# Patient Record
Sex: FEMALE | Race: WHITE | NOT HISPANIC OR LATINO | Employment: FULL TIME
[De-identification: names, ages, dates, MRNs, and addresses within clinical notes are randomized per-mention and may not be internally consistent; named-entity substitution may affect disease eponyms.]

---

## 2017-01-05 ENCOUNTER — PRIOR ORIGINAL RECORDS (OUTPATIENT)
Dept: OTHER | Age: 52
End: 2017-01-05

## 2017-12-31 ENCOUNTER — PRIOR ORIGINAL RECORDS (OUTPATIENT)
Dept: OTHER | Age: 52
End: 2017-12-31

## 2018-02-22 ENCOUNTER — PRIOR ORIGINAL RECORDS (OUTPATIENT)
Dept: OTHER | Age: 53
End: 2018-02-22

## 2018-11-29 ENCOUNTER — HOSPITAL (OUTPATIENT)
Dept: OTHER | Age: 53
End: 2018-11-29
Attending: SPECIALIST

## 2018-12-31 ENCOUNTER — PRIOR ORIGINAL RECORDS (OUTPATIENT)
Dept: OTHER | Age: 53
End: 2018-12-31

## 2019-02-21 ENCOUNTER — PRIOR ORIGINAL RECORDS (OUTPATIENT)
Dept: OTHER | Age: 54
End: 2019-02-21

## 2020-09-19 ENCOUNTER — HOSPITAL ENCOUNTER (EMERGENCY)
Age: 55
Discharge: HOME | End: 2020-09-19
Attending: EMERGENCY MEDICINE

## 2020-09-19 ENCOUNTER — APPOINTMENT (OUTPATIENT)
Dept: GENERAL RADIOLOGY | Age: 55
End: 2020-09-19

## 2020-09-19 VITALS
HEART RATE: 63 BPM | RESPIRATION RATE: 18 BRPM | SYSTOLIC BLOOD PRESSURE: 110 MMHG | BODY MASS INDEX: 20.32 KG/M2 | DIASTOLIC BLOOD PRESSURE: 68 MMHG | OXYGEN SATURATION: 98 % | TEMPERATURE: 98.6 F | WEIGHT: 119.05 LBS | HEIGHT: 64 IN

## 2020-09-19 DIAGNOSIS — S52.616A: ICD-10-CM

## 2020-09-19 DIAGNOSIS — S52.502A CLOSED FRACTURE OF DISTAL END OF LEFT RADIUS, UNSPECIFIED FRACTURE MORPHOLOGY, INITIAL ENCOUNTER: Primary | ICD-10-CM

## 2020-09-19 PROCEDURE — 73110 X-RAY EXAM OF WRIST: CPT

## 2020-09-19 PROCEDURE — 99283 EMERGENCY DEPT VISIT LOW MDM: CPT

## 2020-09-19 PROCEDURE — 29125 APPL SHORT ARM SPLINT STATIC: CPT

## 2020-09-19 ASSESSMENT — ENCOUNTER SYMPTOMS
DIZZINESS: 0
FEVER: 0
CHILLS: 0
WEAKNESS: 0
SHORTNESS OF BREATH: 0
COUGH: 0
NUMBNESS: 0

## 2020-09-21 ENCOUNTER — HOSPITAL ENCOUNTER (OUTPATIENT)
Age: 55
End: 2020-09-21
Attending: ORTHOPAEDIC SURGERY | Admitting: ORTHOPAEDIC SURGERY

## 2020-09-21 ENCOUNTER — HOSPITAL ENCOUNTER (OUTPATIENT)
Dept: LAB | Age: 55
Discharge: HOME | End: 2020-09-21
Attending: ORTHOPAEDIC SURGERY

## 2020-09-21 DIAGNOSIS — R79.1 ABNORMAL COAGULATION PROFILE: ICD-10-CM

## 2020-09-21 DIAGNOSIS — E21.5 PARATHYROID DISEASE (CMD): ICD-10-CM

## 2020-09-21 DIAGNOSIS — E55.9 AVITAMINOSIS D: Primary | ICD-10-CM

## 2020-09-21 DIAGNOSIS — E55.9 VITAMIN D DEFICIENCY, UNSPECIFIED: Primary | ICD-10-CM

## 2020-09-21 DIAGNOSIS — M35.3 POLYMYALGIA RHEUMATICA (CMD): ICD-10-CM

## 2020-09-21 DIAGNOSIS — Z01.812 PRE-PROCEDURAL LABORATORY EXAMINATIONS: Primary | ICD-10-CM

## 2020-09-21 DIAGNOSIS — E55.9 AVITAMINOSIS D: ICD-10-CM

## 2020-09-21 LAB
ANION GAP SERPL CALC-SCNC: 9 MMOL/L (ref 10–20)
APTT PPP: 29 SEC (ref 22–32)
ATRIAL RATE (BPM): 67
BASOPHILS # BLD: 0.1 K/MCL (ref 0–0.3)
BASOPHILS NFR BLD: 1 %
BUN SERPL-MCNC: 13 MG/DL (ref 6–20)
BUN/CREAT SERPL: 16 (ref 7–25)
CALCIUM SERPL-MCNC: 9.3 MG/DL (ref 8.4–10.2)
CHLORIDE SERPL-SCNC: 105 MMOL/L (ref 98–107)
CO2 SERPL-SCNC: 29 MMOL/L (ref 21–32)
CREAT SERPL-MCNC: 0.81 MG/DL (ref 0.51–0.95)
DIFFERENTIAL METHOD BLD: NORMAL
EOSINOPHIL # BLD: 0.2 K/MCL (ref 0.1–0.5)
EOSINOPHIL NFR BLD: 3 %
ERYTHROCYTE [DISTWIDTH] IN BLOOD: 13 % (ref 11–15)
GLUCOSE SERPL-MCNC: 84 MG/DL (ref 65–99)
HCT VFR BLD CALC: 38.5 % (ref 36–46.5)
HGB BLD-MCNC: 12.5 G/DL (ref 12–15.5)
IMM GRANULOCYTES # BLD AUTO: 0 K/MCL (ref 0–0.2)
IMM GRANULOCYTES NFR BLD: 0 %
INR PPP: 1
LYMPHOCYTES # BLD: 2 K/MCL (ref 1–4)
LYMPHOCYTES NFR BLD: 31 %
MCH RBC QN AUTO: 30.2 PG (ref 26–34)
MCHC RBC AUTO-ENTMCNC: 32.5 G/DL (ref 32–36.5)
MCV RBC AUTO: 93 FL (ref 78–100)
MONOCYTES # BLD: 0.5 K/MCL (ref 0.3–0.9)
MONOCYTES NFR BLD: 8 %
NEUTROPHILS # BLD: 3.8 K/MCL (ref 1.8–7.7)
NEUTROPHILS NFR BLD: 57 %
NRBC BLD MANUAL-RTO: 0 /100 WBC
P AXIS (DEGREES): 45
PLATELET # BLD: 233 K/MCL (ref 140–450)
POTASSIUM SERPL-SCNC: 3.6 MMOL/L (ref 3.4–5.1)
PR-INTERVAL (MSEC): 160
PROTHROMBIN TIME: 10.8 SEC (ref 9.7–11.8)
QRS-INTERVAL (MSEC): 98
QT-INTERVAL (MSEC): 406
QTC: 429
R AXIS (DEGREES): 27
RBC # BLD: 4.14 MIL/MCL (ref 4–5.2)
REPORT TEXT: NORMAL
SODIUM SERPL-SCNC: 139 MMOL/L (ref 135–145)
T AXIS (DEGREES): 25
VENTRICULAR RATE EKG/MIN (BPM): 67
WBC # BLD: 6.6 K/MCL (ref 4.2–11)

## 2020-09-21 PROCEDURE — 85025 COMPLETE CBC W/AUTO DIFF WBC: CPT

## 2020-09-21 PROCEDURE — 80048 BASIC METABOLIC PNL TOTAL CA: CPT

## 2020-09-21 PROCEDURE — 36415 COLL VENOUS BLD VENIPUNCTURE: CPT

## 2020-09-21 PROCEDURE — 93005 ELECTROCARDIOGRAM TRACING: CPT | Performed by: ORTHOPAEDIC SURGERY

## 2020-10-09 LAB
ALBUMIN/GLOB SERPL: 1.8 (CALC) (ref 1–2.5)
ALBUMIN: 4.3 G/DL (ref 3.6–5.1)
ALBUMIN: 4.4 G/DL (ref 3.6–5.1)
ALBUMIN: 4.5 G/DL (ref 3.6–5.1)
ALKALINE PHOSPHATASE: 61 UNIT/L (ref 33–130)
ALKALINE PHOSPHATASE: 63 UNIT/L (ref 33–130)
ALKALINE PHOSPHATASE: 65 UNIT/L (ref 33–130)
ALT: 12 UNIT/L (ref 6–29)
ALT: 14 UNIT/L (ref 6–29)
ALT: 17 UNIT/L (ref 6–29)
AST: 17 UNIT/L (ref 10–35)
AST: 17 UNIT/L (ref 10–35)
AST: 20 UNIT/L (ref 10–35)
BASO%: 0.6 %
BASO%: 0.7 %
BASO%: 0.9 %
BASO: 0 10^3/UL
BASO: 0.1 10^3/UL
BASO: 0.1 10^3/UL
BILIRUBIN, TOTAL: 0.4 MG/DL (ref 0.2–1.2)
BILIRUBIN, TOTAL: 0.5 MG/DL (ref 0.2–1.2)
BILIRUBIN, TOTAL: 0.5 MG/DL (ref 0.2–1.2)
BUN/CREATININE RATIO: 18 (CALC) (ref 6–22)
BUN/CREATININE RATIO: 31 (CALC) (ref 6–22)
BUN/CREATININE RATIO: NORMAL (CALC) (ref 6–22)
CALCIUM: 9.5 MG/DL (ref 8.6–10.4)
CALCIUM: 9.9 MG/DL (ref 8.6–10.4)
CALCIUM: 9.9 MG/DL (ref 8.6–10.4)
CARBON DIOXIDE: 23 MMOL/L (ref 20–31)
CARBON DIOXIDE: 25 MMOL/L (ref 20–31)
CARBON DIOXIDE: 28 MMOL/L (ref 20–32)
CHLORIDE: 100 MMOL/L (ref 98–110)
CHLORIDE: 101 MMOL/L (ref 98–110)
CHLORIDE: 102 MMOL/L (ref 98–110)
CRCL (C&G) (MOSAIQ HL): 48.71 ML/MIN
CRCL (C&G) (MOSAIQ HL): 65.77 ML/MIN
CRCL (C&G) (MOSAIQ HL): 71.41 ML/MIN
CREATININE CLEARANCE (MOSAIQ HL): 54.8 ML/MIN
CREATININE CLEARANCE (MOSAIQ HL): 74.9 ML/MIN
CREATININE CLEARANCE (MOSAIQ HL): 82.8 ML/MIN
CREATININE: 0.77 MG/DL (ref 0.5–1.05)
CREATININE: 0.86 MG/DL (ref 0.5–1.05)
CREATININE: 1.19 MG/DL (ref 0.5–1.05)
EGFR AFRICAN AMERICAN: 101 ML/MIN/1.73M2
EGFR AFRICAN AMERICAN: 61 ML/MIN/1.73M2
EGFR AFRICAN AMERICAN: 89 ML/MIN/1.73M2
EGFR NON-AFR. AMERICAN: 52 ML/MIN/1.73M2
EGFR NON-AFR. AMERICAN: 77 ML/MIN/1.73M2
EGFR NON-AFR. AMERICAN: 88 ML/MIN/1.73M2
EOS%: 2.3 %
EOS%: 2.7 %
EOS%: 4.5 %
EOS: 0.2 10^3/UL
EOS: 0.2 10^3/UL
EOS: 0.3 10^3/UL
GLOBULIN: 2.4 G/DL (CALC) (ref 1.9–3.7)
GLOBULIN: 2.5 G/DL (CALC) (ref 1.9–3.7)
GLOBULIN: 2.5 G/DL (CALC) (ref 1.9–3.7)
GLUCOSE: 82 MG/DL (ref 65–99)
GLUCOSE: 83 MG/DL (ref 65–99)
GLUCOSE: 84 MG/DL (ref 65–99)
HCT: 35.5 % (ref 38–54)
HCT: 39 % (ref 38–54)
HCT: 40 % (ref 38–54)
HGB: 12.1 G/DL (ref 12–18)
HGB: 13 G/DL (ref 12–18)
HGB: 13.4 G/DL (ref 12–18)
LYMPH%: 31.8 % (ref 12–44)
LYMPH%: 36.6 % (ref 12–44)
LYMPH%: 42.1 % (ref 12–44)
LYMPH: 2.2 10^3/UL (ref 0.8–2.8)
LYMPH: 2.4 10^3/UL (ref 0.8–2.8)
LYMPH: 2.4 10^3/UL (ref 0.8–2.8)
MCH: 30 PG (ref 26–33)
MCH: 30.1 PG (ref 26–33)
MCH: 30.4 PG (ref 26–33)
MCHC: 33.3 G/DL (ref 31–36)
MCHC: 33.5 G/DL (ref 31–36)
MCHC: 34.1 G/DL (ref 31–36)
MCV: 89.2 FML (ref 82–100)
MCV: 89.5 FML (ref 82–100)
MCV: 90.3 FML (ref 82–100)
MONO%: 8.6 % (ref 2–12)
MONO%: 8.9 % (ref 2–12)
MONO%: 9.1 % (ref 2–12)
MONO: 0.5 10^3/UL (ref 0.2–1)
MONO: 0.6 10^3/UL (ref 0.2–1)
MONO: 0.6 10^3/UL (ref 0.2–1)
MPV: 10 FML (ref 8.6–11.7)
MPV: 9.5 FML (ref 8.6–11.7)
MPV: 9.6 FML (ref 8.6–11.7)
NEUT%: 43.9 % (ref 47–76)
NEUT%: 51.6 % (ref 47–76)
NEUT%: 55.7 % (ref 47–76)
NEUT: 2.5 10^3/UL (ref 1.5–7.1)
NEUT: 3.4 10^3/UL (ref 1.5–7.1)
NEUT: 3.9 10^3/UL (ref 1.5–7.1)
PLT: 244 10^3/UL (ref 150–375)
PLT: 262 10^3/UL (ref 150–375)
PLT: 275 10^3/UL (ref 150–375)
POTASSIUM: 4.1 MMOL/L (ref 3.5–5.3)
POTASSIUM: 4.2 MMOL/L (ref 3.5–5.3)
POTASSIUM: 4.2 MMOL/L (ref 3.5–5.3)
PROTEIN, TOTAL: 6.7 G/DL (ref 6.1–8.1)
PROTEIN, TOTAL: 6.9 G/DL (ref 6.1–8.1)
PROTEIN, TOTAL: 7 G/DL (ref 6.1–8.1)
RBC: 3.98 10^6/UL (ref 4.2–6.2)
RBC: 4.32 10^6/UL (ref 4.2–6.2)
RBC: 4.47 10^6/UL (ref 4.2–6.2)
RDW-CV: 13.1 %
RDW-CV: 13.3 %
RDW-CV: 13.6 %
RDW-SD: 42.7 FML (ref 36–50)
RDW-SD: 42.7 FML (ref 36–50)
RDW-SD: 43.3 FML (ref 36–50)
SODIUM: 136 MMOL/L (ref 135–146)
SODIUM: 138 MMOL/L (ref 135–146)
SODIUM: 139 MMOL/L (ref 135–146)
UREA NITROGEN (BUN): 14 MG/DL (ref 7–25)
UREA NITROGEN (BUN): 21 MG/DL (ref 7–25)
UREA NITROGEN (BUN): 27 MG/DL (ref 7–25)
WBC: 5.7 10^3/UL (ref 4.3–11)
WBC: 6.5 10^3/UL (ref 4.3–11)
WBC: 7 10^3/UL (ref 4.3–11)

## 2020-10-11 VITALS
BODY MASS INDEX: 21.16 KG/M2 | WEIGHT: 119.4 LBS | SYSTOLIC BLOOD PRESSURE: 122 MMHG | DIASTOLIC BLOOD PRESSURE: 76 MMHG | HEIGHT: 63 IN

## 2020-10-11 VITALS — WEIGHT: 122.99 LBS | BODY MASS INDEX: 21 KG/M2 | HEIGHT: 64 IN

## 2020-10-11 VITALS
SYSTOLIC BLOOD PRESSURE: 110 MMHG | BODY MASS INDEX: 20.73 KG/M2 | HEIGHT: 64 IN | DIASTOLIC BLOOD PRESSURE: 64 MMHG | WEIGHT: 121.41 LBS

## 2020-12-31 ENCOUNTER — PRIOR ORIGINAL RECORDS (OUTPATIENT)
Dept: OTHER | Age: 55
End: 2020-12-31

## 2022-06-09 ENCOUNTER — TELEPHONE (OUTPATIENT)
Dept: SCHEDULING | Age: 57
End: 2022-06-09

## 2022-06-09 DIAGNOSIS — Z13.6 SCREENING FOR CARDIOVASCULAR CONDITION: Primary | ICD-10-CM

## 2022-08-23 ENCOUNTER — HOSPITAL ENCOUNTER (OUTPATIENT)
Dept: CT IMAGING | Age: 57
Discharge: HOME | End: 2022-08-23

## 2022-08-23 ENCOUNTER — TELEPHONE (OUTPATIENT)
Dept: CARDIOLOGY | Age: 57
End: 2022-08-23

## 2022-08-23 DIAGNOSIS — Z13.6 SCREENING FOR CARDIOVASCULAR CONDITION: ICD-10-CM

## 2022-08-23 PROCEDURE — 75571 CT HRT W/O DYE W/CA TEST: CPT

## 2023-07-26 DIAGNOSIS — Z90.13 STATUS POST BILATERAL MASTECTOMY: Primary | ICD-10-CM

## 2023-07-26 DIAGNOSIS — Z98.890 STATUS POST BILATERAL BREAST RECONSTRUCTION: ICD-10-CM

## 2023-08-24 ENCOUNTER — HOSPITAL ENCOUNTER (OUTPATIENT)
Dept: CT IMAGING | Age: 58
Discharge: HOME | End: 2023-08-24
Attending: SPECIALIST

## 2023-08-24 DIAGNOSIS — Z98.890 STATUS POST BILATERAL BREAST RECONSTRUCTION: ICD-10-CM

## 2023-08-24 DIAGNOSIS — Z90.13 STATUS POST BILATERAL MASTECTOMY: ICD-10-CM

## 2023-08-24 PROCEDURE — 76641 ULTRASOUND BREAST COMPLETE: CPT

## 2024-08-07 ENCOUNTER — APPOINTMENT (OUTPATIENT)
Dept: URBAN - METROPOLITAN AREA CLINIC 248 | Age: 59
Setting detail: DERMATOLOGY
End: 2024-08-07

## 2024-08-07 DIAGNOSIS — Z71.89 OTHER SPECIFIED COUNSELING: ICD-10-CM

## 2024-08-07 DIAGNOSIS — D18.0 HEMANGIOMA: ICD-10-CM

## 2024-08-07 DIAGNOSIS — L82.1 OTHER SEBORRHEIC KERATOSIS: ICD-10-CM

## 2024-08-07 DIAGNOSIS — Z85.828 PERSONAL HISTORY OF OTHER MALIGNANT NEOPLASM OF SKIN: ICD-10-CM

## 2024-08-07 DIAGNOSIS — L81.4 OTHER MELANIN HYPERPIGMENTATION: ICD-10-CM

## 2024-08-07 DIAGNOSIS — D22 MELANOCYTIC NEVI: ICD-10-CM

## 2024-08-07 PROBLEM — D18.01 HEMANGIOMA OF SKIN AND SUBCUTANEOUS TISSUE: Status: ACTIVE | Noted: 2024-08-07

## 2024-08-07 PROBLEM — D22.5 MELANOCYTIC NEVI OF TRUNK: Status: ACTIVE | Noted: 2024-08-07

## 2024-08-07 PROCEDURE — 99203 OFFICE O/P NEW LOW 30 MIN: CPT

## 2024-08-07 PROCEDURE — OTHER COUNSELING: OTHER

## 2024-08-07 PROCEDURE — OTHER MIPS QUALITY: OTHER

## 2024-08-07 ASSESSMENT — LOCATION ZONE DERM: LOCATION ZONE: TRUNK

## 2024-08-07 ASSESSMENT — LOCATION DETAILED DESCRIPTION DERM
LOCATION DETAILED: LEFT MID-UPPER BACK
LOCATION DETAILED: LEFT SUPERIOR MEDIAL UPPER BACK
LOCATION DETAILED: RIGHT INFERIOR LATERAL MIDBACK
LOCATION DETAILED: EPIGASTRIC SKIN

## 2024-08-07 ASSESSMENT — LOCATION SIMPLE DESCRIPTION DERM
LOCATION SIMPLE: LEFT UPPER BACK
LOCATION SIMPLE: ABDOMEN
LOCATION SIMPLE: RIGHT LOWER BACK

## 2024-08-07 NOTE — PROCEDURE: COUNSELING
Detail Level: Generalized
Sunscreen Recommendations: daily broadspectrum spf 30
Detail Level: Detailed
Sunscreen Recommendations: Broadspectrum spf 30 daily and increase to spf 50 with frequent reapplication while outside\\nElta MD clear tint/no tint, reapply with colorscience mineral brush
Skin Checks Recommendations: monthly self skin exam\\nreviewed ABCDE of melanoma
Erythromycin Pregnancy And Lactation Text: This medication is Pregnancy Category B and is considered safe during pregnancy. It is also excreted in breast milk.

## 2024-08-07 NOTE — PROCEDURE: MIPS QUALITY
Quality 358: Patient-Centered Surgical Risk Assessment And Communication: A patient-specific risk assessment with a risk calculator was not completed or communicated to patient and/or family.
Quality 226: Preventive Care And Screening: Tobacco Use: Screening And Cessation Intervention: Patient screened for tobacco use and is an ex/non-smoker
Detail Level: Detailed
Quality 47: Advance Care Plan: Advance care planning not documented, reason not otherwise specified.

## 2024-10-21 ENCOUNTER — APPOINTMENT (OUTPATIENT)
Dept: OBGYN | Age: 59
End: 2024-10-21

## 2025-08-20 ENCOUNTER — APPOINTMENT (OUTPATIENT)
Dept: URBAN - METROPOLITAN AREA CLINIC 248 | Age: 60
Setting detail: DERMATOLOGY
End: 2025-08-20

## 2025-08-20 DIAGNOSIS — L82.1 OTHER SEBORRHEIC KERATOSIS: ICD-10-CM

## 2025-08-20 DIAGNOSIS — Z85.828 PERSONAL HISTORY OF OTHER MALIGNANT NEOPLASM OF SKIN: ICD-10-CM

## 2025-08-20 DIAGNOSIS — D18.0 HEMANGIOMA: ICD-10-CM

## 2025-08-20 DIAGNOSIS — D22 MELANOCYTIC NEVI: ICD-10-CM

## 2025-08-20 DIAGNOSIS — L81.4 OTHER MELANIN HYPERPIGMENTATION: ICD-10-CM

## 2025-08-20 DIAGNOSIS — Z71.89 OTHER SPECIFIED COUNSELING: ICD-10-CM

## 2025-08-20 PROBLEM — D18.01 HEMANGIOMA OF SKIN AND SUBCUTANEOUS TISSUE: Status: ACTIVE | Noted: 2025-08-20

## 2025-08-20 PROBLEM — D22.5 MELANOCYTIC NEVI OF TRUNK: Status: ACTIVE | Noted: 2025-08-20

## 2025-08-20 PROCEDURE — OTHER COUNSELING: OTHER

## 2025-08-20 PROCEDURE — OTHER MIPS QUALITY: OTHER

## 2025-08-20 PROCEDURE — 99213 OFFICE O/P EST LOW 20 MIN: CPT

## 2025-08-20 ASSESSMENT — LOCATION ZONE DERM: LOCATION ZONE: TRUNK

## 2025-08-20 ASSESSMENT — LOCATION DETAILED DESCRIPTION DERM
LOCATION DETAILED: RIGHT INFERIOR LATERAL MIDBACK
LOCATION DETAILED: LEFT SUPERIOR MEDIAL UPPER BACK
LOCATION DETAILED: LEFT MID-UPPER BACK
LOCATION DETAILED: EPIGASTRIC SKIN

## 2025-08-20 ASSESSMENT — LOCATION SIMPLE DESCRIPTION DERM
LOCATION SIMPLE: LEFT UPPER BACK
LOCATION SIMPLE: ABDOMEN
LOCATION SIMPLE: RIGHT LOWER BACK